# Patient Record
Sex: FEMALE | Race: WHITE | NOT HISPANIC OR LATINO | Employment: FULL TIME | URBAN - METROPOLITAN AREA
[De-identification: names, ages, dates, MRNs, and addresses within clinical notes are randomized per-mention and may not be internally consistent; named-entity substitution may affect disease eponyms.]

---
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
## 2022-01-15 ENCOUNTER — HEALTH MAINTENANCE LETTER (OUTPATIENT)
Age: 22
End: 2022-01-15

## 2022-11-11 ENCOUNTER — OFFICE VISIT (OUTPATIENT)
Dept: URGENT CARE | Facility: URGENT CARE | Age: 22
End: 2022-11-11
Payer: COMMERCIAL

## 2022-11-11 VITALS
SYSTOLIC BLOOD PRESSURE: 142 MMHG | OXYGEN SATURATION: 100 % | BODY MASS INDEX: 24.03 KG/M2 | HEART RATE: 99 BPM | TEMPERATURE: 98.8 F | DIASTOLIC BLOOD PRESSURE: 94 MMHG | WEIGHT: 140 LBS

## 2022-11-11 DIAGNOSIS — J01.90 ACUTE SINUSITIS WITH SYMPTOMS > 10 DAYS: ICD-10-CM

## 2022-11-11 DIAGNOSIS — R07.0 THROAT PAIN: Primary | ICD-10-CM

## 2022-11-11 LAB
DEPRECATED S PYO AG THROAT QL EIA: NEGATIVE
GROUP A STREP BY PCR: NOT DETECTED

## 2022-11-11 PROCEDURE — 87651 STREP A DNA AMP PROBE: CPT | Performed by: NURSE PRACTITIONER

## 2022-11-11 PROCEDURE — 99203 OFFICE O/P NEW LOW 30 MIN: CPT | Performed by: NURSE PRACTITIONER

## 2022-11-11 RX ORDER — IBUPROFEN 600 MG/1
TABLET ORAL
COMMUNITY
Start: 2022-05-12 | End: 2024-01-16

## 2022-11-11 RX ORDER — DROSPIRENONE AND ETHINYL ESTRADIOL 0.02-3(28)
KIT ORAL
COMMUNITY
Start: 2022-10-11

## 2022-11-11 ASSESSMENT — ENCOUNTER SYMPTOMS
WHEEZING: 0
VOMITING: 0
EYE DISCHARGE: 0
FEVER: 0
SHORTNESS OF BREATH: 0
CHILLS: 0
SINUS PAIN: 1
COUGH: 1
NAUSEA: 0
SORE THROAT: 1
RHINORRHEA: 0
SINUS PRESSURE: 0

## 2022-11-12 NOTE — PATIENT INSTRUCTIONS
Rapid strep test today is negative.   Your throat culture is pending. Clinic will call if positive results to start antibiotics at that time; No call if the culture is negative.  Drink plenty of fluids and rest.  May use salt water gargles- about 8 oz warm water with about 1 teaspoon salt  Sucrets and Cepacol spray are over the counter medications that numb the throat.  Over the counter pain relievers such as tylenol or ibuprofen may be used as needed.   Mucinex is product known to help loosen congestion and thin mucus (generic is guaifenesin)   Delsym 12 hour over the counter works well for cough.  Honey has been shown to be helpful in cough management and is soothing to a sore throat. May add to lemon tea.  Please follow up with primary care provider if not improving, worsening or new symptoms.

## 2022-11-12 NOTE — PROGRESS NOTES
Assessment & Plan     Throat pain  - Streptococcus A Rapid Screen w/Reflex to PCR - Clinic Collect pending  - Group A Streptococcus PCR Throat Swab negative    Acute sinusitis with symptoms > 10 days  - amoxicillin-clavulanate (AUGMENTIN) 875-125 MG tablet  Dispense: 20 tablet; Refill: 0    Return in about 2 days (around 11/13/2022).    Ekta Valentine NP  [facility] is a 22 year old female (with her mother) who presents to clinic today for the following health issues: Patient has body aches, runny nose, sore throat, now its only on the right side of throat starting yesterday, right ear, sinus congestion, sinus pressure bilateral, cough, and no fever for two weeks. Patient states it hurts to swallow 5/10.  Patient had been taking DayQuil and NyQuil with some relief of the symptoms but not recently.  Patient works at a  and is exposed to strep, influenza, RSV and patient has a history of sinus infections.  Chief Complaint   Patient presents with     Cough     Cough and sore throat on one side.  Sick for  a couple weeks now, cold symptoms, hurts to swallow.     URI Adult  Onset of symptoms was 2 week(s) ago.  Course of illness is worsening.    Severity moderately severe  Current and Associated symptoms: runny nose, stuffy nose, cough - non-productive, cough - productive, ear pain right and body aches  Treatment measures tried include Dayquil and Nyquil .  Predisposing factors include works at  exposure to strep, exposure to influenza, exposure to RSV and chronic sinusitis    Review of Systems   Constitutional: Negative for chills and fever.   HENT: Positive for congestion, ear pain, sinus pain and sore throat. Negative for postnasal drip, rhinorrhea and sinus pressure.    Eyes: Negative for discharge.   Respiratory: Positive for cough. Negative for shortness of breath and wheezing.    Cardiovascular: Negative for chest pain.   Gastrointestinal:  Negative for nausea and vomiting.   Skin: Negative for rash.         Objective    BP (!) 142/94   Pulse 99   Temp 98.8  F (37.1  C) (Tympanic)   Wt 63.5 kg (140 lb)   SpO2 100%   BMI 24.03 kg/m    Physical Exam  Vitals and nursing note reviewed.   Constitutional:       Appearance: Normal appearance.   HENT:      Head: Normocephalic and atraumatic.      Right Ear: Tympanic membrane, ear canal and external ear normal.      Left Ear: Tympanic membrane, ear canal and external ear normal.      Mouth/Throat:      Mouth: Mucous membranes are moist.      Pharynx: Posterior oropharyngeal erythema present.      Comments: Patient does not have tonsillar abscess   Eyes:      Conjunctiva/sclera: Conjunctivae normal.   Cardiovascular:      Rate and Rhythm: Normal rate and regular rhythm.      Pulses: Normal pulses.      Heart sounds: Normal heart sounds.   Pulmonary:      Effort: Pulmonary effort is normal.      Breath sounds: Normal breath sounds.   Lymphadenopathy:      Cervical: Cervical adenopathy present.   Neurological:      Mental Status: She is alert.

## 2023-01-07 ENCOUNTER — HEALTH MAINTENANCE LETTER (OUTPATIENT)
Age: 23
End: 2023-01-07

## 2023-04-22 ENCOUNTER — HEALTH MAINTENANCE LETTER (OUTPATIENT)
Age: 23
End: 2023-04-22

## 2024-01-16 ENCOUNTER — OFFICE VISIT (OUTPATIENT)
Dept: URGENT CARE | Facility: URGENT CARE | Age: 24
End: 2024-01-16
Payer: COMMERCIAL

## 2024-01-16 VITALS
SYSTOLIC BLOOD PRESSURE: 120 MMHG | TEMPERATURE: 99 F | DIASTOLIC BLOOD PRESSURE: 76 MMHG | OXYGEN SATURATION: 100 % | BODY MASS INDEX: 23.86 KG/M2 | HEART RATE: 92 BPM | WEIGHT: 139 LBS | RESPIRATION RATE: 16 BRPM

## 2024-01-16 DIAGNOSIS — R07.0 THROAT PAIN: ICD-10-CM

## 2024-01-16 LAB
DEPRECATED S PYO AG THROAT QL EIA: NEGATIVE
GROUP A STREP BY PCR: NOT DETECTED

## 2024-01-16 PROCEDURE — 99213 OFFICE O/P EST LOW 20 MIN: CPT | Performed by: PHYSICIAN ASSISTANT

## 2024-01-16 PROCEDURE — 87651 STREP A DNA AMP PROBE: CPT | Performed by: PHYSICIAN ASSISTANT

## 2024-01-16 NOTE — PROGRESS NOTES
Assessment & Plan     Throat pain  Rapid strep negative, PCR pending. Exam consistent with viral sore throat from post nasal drainage. Continue with supportive care. Get plenty of rest and push fluids. Can use Tylenol and/or ibuprofen as needed for pain. Can use over the counter decongestant/antihistamine as needed to help decrease sins drainage. Discussed return to school/work guidelines. Return to clinic if symptoms worsen or do not improve; otherwise follow up as needed     - Streptococcus A Rapid Screen w/Reflex to PCR - Clinic Collect  - Group A Streptococcus PCR Throat Swab                 Return in about 1 week (around 1/23/2024), or if symptoms worsen or fail to improve.    [facility] [place]              Subjective   Chief Complaint   Patient presents with    Throat Pain     Pt was positive on 12/23 for covid, sore throat, cough, running nose, no fever, ears and jaws also hurt       HPI     URI Adult    Onset of symptoms was 3 week(s) ago.  Course of illness is waxing and waning.    Severity moderate  Current and Associated symptoms: cough, runny nose, sore throat   Treatment measures tried include None tried.  Predisposing factors include None.                  Review of Systems         Objective    /76   Pulse 92   Temp 99  F (37.2  C)   Resp 16   Wt 63 kg (139 lb)   SpO2 100%   BMI 23.86 kg/m    Body mass index is 23.86 kg/m .    Physical Exam  Constitutional:       Appearance: She is well-developed.   HENT:      Head: Normocephalic.      Right Ear: Tympanic membrane and ear canal normal.      Left Ear: Tympanic membrane and ear canal normal.      Mouth/Throat:      Pharynx: Posterior oropharyngeal erythema present.   Eyes:      Conjunctiva/sclera: Conjunctivae normal.   Cardiovascular:      Rate and Rhythm: Normal rate.      Heart sounds: Normal heart sounds.   Pulmonary:      Effort: Pulmonary effort is normal.      Breath sounds: Normal  breath sounds.   Skin:     General: Skin is warm and dry.      Findings: No rash.   Psychiatric:         Behavior: Behavior normal.            Results for orders placed or performed in visit on 01/16/24 (from the past 24 hour(s))   Streptococcus A Rapid Screen w/Reflex to PCR - Clinic Collect    Specimen: Throat; Swab   Result Value Ref Range    Group A Strep antigen Negative Negative

## 2024-01-18 ENCOUNTER — VIRTUAL VISIT (OUTPATIENT)
Dept: URGENT CARE | Facility: CLINIC | Age: 24
End: 2024-01-18
Payer: COMMERCIAL

## 2024-01-18 ENCOUNTER — TELEPHONE (OUTPATIENT)
Dept: URGENT CARE | Facility: URGENT CARE | Age: 24
End: 2024-01-18
Payer: COMMERCIAL

## 2024-01-18 ENCOUNTER — LAB (OUTPATIENT)
Dept: LAB | Facility: CLINIC | Age: 24
End: 2024-01-18
Attending: PHYSICIAN ASSISTANT
Payer: COMMERCIAL

## 2024-01-18 DIAGNOSIS — J02.9 ACUTE PHARYNGITIS, UNSPECIFIED ETIOLOGY: ICD-10-CM

## 2024-01-18 DIAGNOSIS — J02.9 ACUTE PHARYNGITIS, UNSPECIFIED ETIOLOGY: Primary | ICD-10-CM

## 2024-01-18 DIAGNOSIS — R50.9 FEVER IN ADULT: ICD-10-CM

## 2024-01-18 LAB
DEPRECATED S PYO AG THROAT QL EIA: NEGATIVE
FLUAV AG SPEC QL IA: NEGATIVE
FLUBV AG SPEC QL IA: NEGATIVE
GROUP A STREP BY PCR: NOT DETECTED

## 2024-01-18 PROCEDURE — 87804 INFLUENZA ASSAY W/OPTIC: CPT

## 2024-01-18 PROCEDURE — 99441 PR PHYSICIAN TELEPHONE EVALUATION 5-10 MIN: CPT | Mod: 93

## 2024-01-18 PROCEDURE — 87651 STREP A DNA AMP PROBE: CPT

## 2024-01-18 NOTE — LETTER
[facility]  [address] [address]  Phone: [phone]    January 18, 2024        Susan Arzate  [address] [place] [id number]          To whom it may concern:    RE: Susan Arzate    Patient was seen and treated today at our clinic. She must be fever free for 24 hours and having improvement in symptoms before returning to work. Please excuse her from work missed during this time. If she must be absent beyond 1.24.24, she'll need to be seen for further evaluation.       Please contact me for questions or concerns.      Sincerely,    Barbara Bravo PA-C

## 2024-01-18 NOTE — PROGRESS NOTES
"Assessment & Plan     Acute pharyngitis, unspecified etiology  - Streptococcus A Rapid Screen w/Reflex to PCR - Clinic Collect; Future  - Influenza A & B Antigen - Clinic Collect; Future    Fever in adult  - Streptococcus A Rapid Screen w/Reflex to PCR - Clinic Collect; Future  - Influenza A & B Antigen - Clinic Collect; Future    Strep and influenza testing ordered, to be collected later today.  Opted to repeat test for strep as she states the sore throat is worsening.  Will also treat for influenza as symptoms got significantly worse yesterday it is possible that she caught the flu on top of a different underlying viral infection.  Tylenol ibuprofen fluids rest.  If strep is positive we will treat with penicillin twice daily for 10 days, if influenza positive treat with Tamiflu twice daily for 5 days.    UPDATE: Strep and influenza are both negative.  We discussed taking Tylenol and ibuprofen for pain.  Mono testing was added to be collected early next week if symptoms are persistent.    Return in about 1 week (around 1/25/2024) for visit with primary care provider if not improving.     Barbara Bravo PA-C  [facility]    Subjective   Susan Arzate is a 23 year old who presents for the following health issues    HPI    Susan presents via telephone for evaluation of URI symptoms.  She had COVID about 3 weeks ago.  Symptoms did seem to improve but then she developed a worsening sore throat.  She was seen in urgent care a couple days ago and tested for strep and it was negative.  Yesterday morning, she woke up with a temperature of 99.5F decided to stay home from work.  [address] through the day, symptoms significantly worsened with a temperature up to 101.2 and body aches.  Her sore throat is worse and she feels like there is mucus \" in the back of my head.\".  Recently had 2 negative home COVID test.    Review of Systems   ROS negative except as stated above.      Objective    Physical " Exam   healthy, alert and no distress  PSYCH: Alert and oriented times 3; coherent speech, normal   rate and volume, able to articulate logical thoughts, able   to abstract reason, no tangential thoughts, no hallucinations   or delusions. Affect is normal and pleasant  RESP: No cough, no audible wheezing, able to talk in full sentences  Remainder of exam unable to be completed due to telephone visits    Call duration: 8 min  Provider location: offsite at home, [facility]    Results for orders placed or performed in visit on 01/18/24   Streptococcus A Rapid Screen w/Reflex to PCR - Clinic Collect     Status: Normal    Specimen: Throat; Swab   Result Value Ref Range    Group A Strep antigen Negative Negative   Influenza A & B Antigen - Clinic Collect     Status: Normal    Specimen: Nose; Swab   Result Value Ref Range    Influenza A antigen Negative Negative    Influenza B antigen Negative Negative    Narrative    Test results must be correlated with clinical data. If necessary, results should be confirmed by a molecular assay or viral culture.

## 2024-01-18 NOTE — TELEPHONE ENCOUNTER
New Medication Request        What medication are you requesting?: Tamiflu    Reason for medication request: influenza    Have you taken this medication before?: No    Controlled Substance Agreement on file:   CSA -- Patient Level:    CSA: None found at the patient level.         Patient offered an appointment? No. Patient was into urgent care on 1-16. Is wondering if she could get an Rx for tamiflu    Preferred Pharmacy:       [id number] - [address] [address]  Phone: [phone] Fax: [phone]      Could we send this information to you in [employer] or would you prefer to receive a phone call?:   Patient would prefer a phone call   Okay to leave a detailed message?: Yes at Home number on file [phone] (home)

## 2024-01-18 NOTE — TELEPHONE ENCOUNTER
Informed patient with new symptoms she will need to either do an e-visit or come to urgent care and be seen again to be tested.    July Guerra LPN

## 2024-06-29 ENCOUNTER — HEALTH MAINTENANCE LETTER (OUTPATIENT)
Age: 24
End: 2024-06-29

## 2024-10-03 NOTE — PATIENT INSTRUCTIONS
"Drink plenty of fluids and rest.  May use salt water gargles- about 8 oz warm water with about 1 teaspoon salt  Take Tylenol or an NSAID such as ibuprofen or naproxen as needed for pain.  May take up to 1000 mg of Tylenol every 6-8 hours- do not exceed 4000 mg in 24 hours.  May take up to 600 mg ibuprofen every 6-8 hours.  Alternate Tylenol and Ibuprofen every 4 hours  Honey lemon tea helps to soothe the throat. \"Throat Coat\" tea is soothing as well.  Please follow up with primary care provider if not improving, worsening or new symptoms.    " decreased flexibility/pain/decreased ROM/decreased strength

## 2025-07-12 ENCOUNTER — HEALTH MAINTENANCE LETTER (OUTPATIENT)
Age: 25
End: 2025-07-12